# Patient Record
Sex: MALE | Race: WHITE | NOT HISPANIC OR LATINO
[De-identification: names, ages, dates, MRNs, and addresses within clinical notes are randomized per-mention and may not be internally consistent; named-entity substitution may affect disease eponyms.]

---

## 2022-05-17 PROBLEM — Z00.00 ENCOUNTER FOR PREVENTIVE HEALTH EXAMINATION: Status: ACTIVE | Noted: 2022-05-17

## 2022-05-18 ENCOUNTER — APPOINTMENT (OUTPATIENT)
Dept: ORTHOPEDIC SURGERY | Facility: CLINIC | Age: 67
End: 2022-05-18
Payer: COMMERCIAL

## 2022-05-18 VITALS — HEIGHT: 74 IN | BODY MASS INDEX: 25.67 KG/M2 | WEIGHT: 200 LBS

## 2022-05-18 DIAGNOSIS — Z86.79 PERSONAL HISTORY OF OTHER DISEASES OF THE CIRCULATORY SYSTEM: ICD-10-CM

## 2022-05-18 DIAGNOSIS — M79.604 PAIN IN RIGHT LEG: ICD-10-CM

## 2022-05-18 DIAGNOSIS — Z78.9 OTHER SPECIFIED HEALTH STATUS: ICD-10-CM

## 2022-05-18 DIAGNOSIS — Z86.39 PERSONAL HISTORY OF OTHER ENDOCRINE, NUTRITIONAL AND METABOLIC DISEASE: ICD-10-CM

## 2022-05-18 PROCEDURE — 99203 OFFICE O/P NEW LOW 30 MIN: CPT

## 2022-05-18 PROCEDURE — 72100 X-RAY EXAM L-S SPINE 2/3 VWS: CPT

## 2022-05-18 PROCEDURE — 73590 X-RAY EXAM OF LOWER LEG: CPT | Mod: RT

## 2022-05-18 RX ORDER — ATORVASTATIN CALCIUM 10 MG/1
10 TABLET, FILM COATED ORAL
Refills: 0 | Status: ACTIVE | COMMUNITY

## 2022-05-18 NOTE — PHYSICAL EXAM
[Normal Mood and Affect] : normal mood and affect [Able to Communicate] : able to communicate [Well Developed] : well developed [Well Nourished] : well nourished [Bilateral] : foot and ankle bilaterally [Right] : right tibia/fibula [4___] : right quadriceps 4[unfilled]/5 [FreeTextEntry3] : Inspection Lumbosacral spine normal [de-identified] : Straight leg raising is negative bilaterally [FreeTextEntry1] : Reviewed and interpreted.  Lumbosacral spine AP and lateral views-mild to moderate multilevel degenerative disc disease.  Mild aortic calcification [] : no calf tenderness [FreeTextEntry8] : No tenderness right leg [de-identified] : Reviewed and interpreted.  Right tib-fib AP and lateral views-negative

## 2022-05-18 NOTE — HISTORY OF PRESENT ILLNESS
[Right Leg] : right leg [Gradual] : gradual [8] : 8 [Radiating] : radiating [Sharp] : sharp [Intermittent] : intermittent [Household chores] : household chores [Rest] : rest [Walking] : walking [Exercising] : exercising [Retired] : Work status: retired [de-identified] : Patient is a 66-year-old male with onset of lower back pain after a long car ride in January 2022.  He had driven to [place].  Since that time he has mild to moderate pain in his lower leg when sitting and driving.  Pain is on the lateral aspect of his leg and gets worse as the day goes on.  He says he feels the pain in his thigh as well toward the end of the day.  Better when standing and walking.  No awakening from sleep at night.  Mild weakness.  No numbness.  No back pain.  No swelling in his leg.  Taking ibuprofen 800 mg p.r.n. [] : Post Surgical Visit: no [FreeTextEntry7] : R Leg

## 2022-05-18 NOTE — DISCUSSION/SUMMARY
[de-identified] : Continue ibuprofen 800 mg t.i.d. with food p.r.n.\par He will avoid irritating activities\par He'll have MRI lumbosacral spine\par If for any reason he develops any significant neurologic changes, weakness and lower extremities, loss of bowel bladder control, advised to go to emergency room immediately\par \par Impression:\par Lumbosacral spondylosis/radiculopathy

## 2022-05-24 ENCOUNTER — FORM ENCOUNTER (OUTPATIENT)
Age: 67
End: 2022-05-24

## 2022-05-24 ENCOUNTER — APPOINTMENT (OUTPATIENT)
Dept: MRI IMAGING | Facility: CLINIC | Age: 67
End: 2022-05-24

## 2022-05-25 ENCOUNTER — APPOINTMENT (OUTPATIENT)
Dept: MRI IMAGING | Facility: CLINIC | Age: 67
End: 2022-05-25
Payer: COMMERCIAL

## 2022-05-25 PROCEDURE — 72148 MRI LUMBAR SPINE W/O DYE: CPT

## 2022-05-27 ENCOUNTER — NON-APPOINTMENT (OUTPATIENT)
Age: 67
End: 2022-05-27

## 2022-06-01 ENCOUNTER — APPOINTMENT (OUTPATIENT)
Dept: ORTHOPEDIC SURGERY | Facility: CLINIC | Age: 67
End: 2022-06-01
Payer: COMMERCIAL

## 2022-06-01 VITALS — WEIGHT: 200 LBS | BODY MASS INDEX: 25.67 KG/M2 | HEIGHT: 74 IN

## 2022-06-01 PROCEDURE — 99214 OFFICE O/P EST MOD 30 MIN: CPT

## 2022-06-01 RX ORDER — METHYLPREDNISOLONE 4 MG/1
4 TABLET ORAL
Qty: 1 | Refills: 0 | Status: COMPLETED | COMMUNITY
Start: 2022-06-01

## 2022-06-01 NOTE — HISTORY OF PRESENT ILLNESS
[Lower back] : lower back [Right Leg] : right leg [Gradual] : gradual [8] : 8 [Radiating] : radiating [Sharp] : sharp [Household chores] : household chores [Leisure] : leisure [Rest] : rest [Sitting] : sitting [Retired] : Work status: retired [de-identified] : The patient has continued moderate sharp pain radiating down his right leg when sitting and driving.  Pain radiates in the gluteal region.  No numbness or weakness.  Occasional awakening from sleep at night.  Taking ibuprofen p.r.n.  He had MRI lumbosacral spine [] : Post Surgical Visit: no [FreeTextEntry7] : R Leg  [de-identified] : MRI

## 2022-06-01 NOTE — PHYSICAL EXAM
[Normal Mood and Affect] : normal mood and affect [Able to Communicate] : able to communicate [Well Developed] : well developed [Well Nourished] : well nourished [5___] : right quadriceps 5[unfilled]/5 [Bilateral] : foot and ankle bilaterally [2+] : posterior tibialis pulse: 2+ [FreeTextEntry3] : Inspection lumbosacral spinal [FreeTextEntry8] : Mild tenderness right lumbosacral region [de-identified] : Remainder motor lower extremities normal [de-identified] : Straight leg raising is positive for gluteal and leg pain at 50° on the right [] : no calf tenderness

## 2022-06-01 NOTE — DATA REVIEWED
[MRI] : MRI [Lumbar Spine] : lumbar spine [I independently reviewed and interpreted images and report] : I independently reviewed and interpreted images and report [FreeTextEntry1] : MRI lumbosacral spine done 5/25/22 was reviewed. There is mild multilevel degenerative disc disease. Mild broad disc protrusions L2-3, L3-4 and L4-5. No significant herniations or stenosis.\par Also reported as 2.2 cm cyst medial right kidney. Consider ultrasound to further evaluate as clinically indicated \par

## 2022-06-14 ENCOUNTER — APPOINTMENT (OUTPATIENT)
Dept: ORTHOPEDIC SURGERY | Facility: CLINIC | Age: 67
End: 2022-06-14
Payer: COMMERCIAL

## 2022-06-14 ENCOUNTER — NON-APPOINTMENT (OUTPATIENT)
Age: 67
End: 2022-06-14

## 2022-06-14 VITALS — HEIGHT: 74 IN | BODY MASS INDEX: 25.67 KG/M2 | WEIGHT: 200 LBS

## 2022-06-14 PROCEDURE — 99214 OFFICE O/P EST MOD 30 MIN: CPT

## 2022-06-14 RX ORDER — LOSARTAN POTASSIUM AND HYDROCHLOROTHIAZIDE 12.5; 1 MG/1; MG/1
100-12.5 TABLET ORAL
Qty: 90 | Refills: 0 | Status: ACTIVE | COMMUNITY
Start: 2021-12-09

## 2022-06-14 RX ORDER — METHYLPREDNISOLONE 4 MG/1
4 TABLET ORAL
Qty: 1 | Refills: 0 | Status: COMPLETED | COMMUNITY
Start: 2022-06-01 | End: 2022-06-14

## 2022-06-14 RX ORDER — VALACYCLOVIR 1 G/1
1 TABLET, FILM COATED ORAL
Qty: 30 | Refills: 0 | Status: ACTIVE | COMMUNITY
Start: 2022-02-10

## 2022-06-14 RX ORDER — FLUTICASONE PROPIONATE 50 UG/1
50 SPRAY, METERED NASAL
Qty: 48 | Refills: 0 | Status: ACTIVE | COMMUNITY
Start: 2022-01-25

## 2022-06-14 NOTE — HISTORY OF PRESENT ILLNESS
[Lower back] : lower back [Gradual] : gradual [7] : 7 [Radiating] : radiating [Household chores] : household chores [Leisure] : leisure [Rest] : rest [Walking] : walking [] : yes [Retired] : Work status: retired [de-identified] : The patient says he feels a little better after completing Medrol Dosepak.  He has been taking gabapentin 300 mg q.h.s. which has been helping him a little.  He could not tolerate 600 mg q.h.s.  This made him to tired.  He saw Dr. Sakina Simons.  He had MRIs of his thoracic spine and pelvis\par He has continued mild to moderate pain right gluteal region and leg when sitting and driving.  No numbness or weakness

## 2022-06-14 NOTE — DATA REVIEWED
[Thoracic Spine] : thoracic spine [I independently reviewed and interpreted images and report] : I independently reviewed and interpreted images and report [MRI] : MRI [Pelvis] : pelvis [FreeTextEntry2] : MRI of the pelvis done at Dr. Mclean June 14, 2022 without and with contrast ordered by Dr. Simons was reviewed.  There are moderate increase signal changes and the posterolateral gluteal musculature on the right.  Degenerative changes of the hips bilaterally with parallabral ganglion cysts [FreeTextEntry1] : MRI thoracic spine done at Dr. Mclean June 14, 2022 without and with contrast ordered by Dr. Simons was reviewed.  There are mild degenerative changes.  No significant stenosis or herniations.  Also reported as large hemangioma T6.  Small hemangiomas T10 and T11

## 2022-06-14 NOTE — DISCUSSION/SUMMARY
[Medication Risks Reviewed] : Medication risks reviewed [de-identified] : MRI results thoracic spine and pelvis were discussed with the patient\par Ibuprofen p.r.n.\par Continue gabapentin 300 mg q.h.s.\par Tylenol p.r.n.\par He will avoid irritating activities\par He is going to be driving to [place] in 8 days.  Recommend he stop frequently\par He will resume formal PT at [facility] in [place]\par \par \par Impression:\par Lumbosacral spondylosis\par Gluteal strain right hip

## 2022-06-14 NOTE — PHYSICAL EXAM
[Normal Mood and Affect] : normal mood and affect [Able to Communicate] : able to communicate [Well Developed] : well developed [Well Nourished] : well nourished [Bilateral] : foot and ankle bilaterally [2+] : posterior tibialis pulse: 2+ [FreeTextEntry3] : Inspection lumbosacral spinal [de-identified] : Right hip extensors 4+/5, remainder motor lower extremities normal [de-identified] : Straight leg raising is positive for gluteal and leg pain at 50° on the right [FreeTextEntry8] : Moderate tenderness right posterior gluteal region [] : no calf tenderness

## 2022-08-09 ENCOUNTER — APPOINTMENT (OUTPATIENT)
Dept: ORTHOPEDIC SURGERY | Facility: CLINIC | Age: 67
End: 2022-08-09

## 2022-08-09 VITALS — WEIGHT: 200 LBS | BODY MASS INDEX: 25.67 KG/M2 | HEIGHT: 74 IN

## 2022-08-09 DIAGNOSIS — M76.01 GLUTEAL TENDINITIS, RIGHT HIP: ICD-10-CM

## 2022-08-09 PROCEDURE — 99213 OFFICE O/P EST LOW 20 MIN: CPT

## 2022-08-09 RX ORDER — GABAPENTIN 300 MG/1
300 CAPSULE ORAL
Qty: 60 | Refills: 1 | Status: DISCONTINUED | COMMUNITY
Start: 2022-06-01 | End: 2022-08-09

## 2022-08-09 NOTE — IMAGING
[de-identified] : Normal gait\par \par Lumbosacral spine\par Inspection normal\par Slight tenderness right lumbosacral region\par Negative straight leg raising bilaterally\par \par Right hip\par Slight tenderness posterior gluteal region\par No pain with passive motion of the hip\par \par Both legs\par No swelling\par Calves are soft and nontender\par Posterior tibial pulse 2+ bilaterally

## 2022-08-09 NOTE — DISCUSSION/SUMMARY
[Medication Risks Reviewed] : Medication risks reviewed [de-identified] : Ibuprofen p.r.n.\par Tylenol p.r.n.\par He will avoid irritating activities\par Continuedl PT at [facility] in [place] over the next several weeks, then home program\par Continue home exercises\par \par \par Impression:\par Lumbosacral spondylosis\par Gluteal strain right hip

## 2022-08-09 NOTE — HISTORY OF PRESENT ILLNESS
[Gradual] : gradual [7] : 7 [Dull/Aching] : dull/aching [Intermittent] : intermittent [Physical therapy] : physical therapy [Exercising] : exercising [Retired] : Work status: retired [de-identified] : The patient is feeling much better.  He has been going to physical therapy twice a repeat.  Doing home exercises.  He has mild occasional pain right lumbosacral region.  No radicular complaints.  No numbness or weakness in his lower extremities.  Not taking any medications at the present time [] : Post Surgical Visit: no

## 2022-08-17 ENCOUNTER — FORM ENCOUNTER (OUTPATIENT)
Age: 67
End: 2022-08-17

## 2022-08-18 ENCOUNTER — APPOINTMENT (OUTPATIENT)
Dept: ORTHOPEDIC SURGERY | Facility: CLINIC | Age: 67
End: 2022-08-18

## 2022-08-18 ENCOUNTER — APPOINTMENT (OUTPATIENT)
Dept: MRI IMAGING | Facility: CLINIC | Age: 67
End: 2022-08-18

## 2022-08-18 VITALS — HEIGHT: 74 IN | BODY MASS INDEX: 25.67 KG/M2 | WEIGHT: 200 LBS

## 2022-08-18 PROCEDURE — 73721 MRI JNT OF LWR EXTRE W/O DYE: CPT | Mod: RT,MH

## 2022-08-18 PROCEDURE — 99214 OFFICE O/P EST MOD 30 MIN: CPT

## 2022-08-18 PROCEDURE — 73610 X-RAY EXAM OF ANKLE: CPT | Mod: RT

## 2022-08-18 RX ORDER — LOSARTAN POTASSIUM 100 MG/1
TABLET, FILM COATED ORAL
Refills: 0 | Status: COMPLETED | COMMUNITY
End: 2022-08-18

## 2022-08-18 NOTE — DISCUSSION/SUMMARY
[de-identified] : The patient was given a lace up ankle support to use when standing and walking p.r.n.\par Ice p.r.n.\par Ibuprofen p.r.n.\par He will have MRI right ankle\par \par Impression:\par Sinus tarsi syndrome right ankle

## 2022-08-18 NOTE — IMAGING
[Right] : right ankle [de-identified] : Slight right antalgic gait\par \par Right leg\par No swelling\par Calf is soft and nontender\par \par Right ankle\par No swelling\par Mild to moderate tenderness over the sinus Tarsi\par Mildly decreased passive range of motion of the ankle\par Ankle strength 5/5\par Negative De Oliveira test\par Posterior tibial pulse 2+  [FreeTextEntry9] : Reviewed and interpreted.  Right ankle AP, lateral and mortise-spurring anterior talar neck.  Mild degenerative changes of the subtalar joint

## 2022-08-18 NOTE — HISTORY OF PRESENT ILLNESS
[Gradual] : gradual [9] : 9 [3] : 3 [Dull/Aching] : dull/aching [Radiating] : radiating [Sharp] : sharp [Leisure] : leisure [Rest] : rest [Standing] : standing [Walking] : walking [Retired] : Work status: retired [de-identified] : The patient has had pain right lateral ankle region over the past several months.  No injury.  He has mild to moderate pain when standing and walking.  Pain after sitting and getting up.  No swelling.  Taking ibuprofen p.r.n. [] : Post Surgical Visit: no [FreeTextEntry7] : Right lateral leg

## 2022-08-19 ENCOUNTER — TRANSCRIPTION ENCOUNTER (OUTPATIENT)
Age: 67
End: 2022-08-19

## 2022-08-21 ENCOUNTER — NON-APPOINTMENT (OUTPATIENT)
Age: 67
End: 2022-08-21

## 2022-08-25 ENCOUNTER — APPOINTMENT (OUTPATIENT)
Dept: ORTHOPEDIC SURGERY | Facility: CLINIC | Age: 67
End: 2022-08-25

## 2022-08-25 VITALS — WEIGHT: 200 LBS | HEIGHT: 74 IN | BODY MASS INDEX: 25.67 KG/M2

## 2022-08-25 DIAGNOSIS — M76.71 PERONEAL TENDINITIS, RIGHT LEG: ICD-10-CM

## 2022-08-25 DIAGNOSIS — R93.6 ABNORMAL FINDINGS ON DIAGNOSTIC IMAGING OF LIMBS: ICD-10-CM

## 2022-08-25 PROCEDURE — 99214 OFFICE O/P EST MOD 30 MIN: CPT

## 2022-08-25 NOTE — IMAGING
[de-identified] : Normal gait\par \par Right leg\par No swelling\par Calf is soft and nontender\par \par Right ankle\par No swelling\par Mild tenderness over the sinus Tarsi.  Mild tenderness over the distal peroneal tendons\par Mildly decreased passive range of motion of the ankle\par Ankle strength 5/5\par Negative De Oliveira test\par Posterior tibial pulse 2+

## 2022-08-25 NOTE — HISTORY OF PRESENT ILLNESS
[Rest] : rest [Walking] : walking [Retired] : Work status: retired [de-identified] : The patient is feeling a lot better.  He has been wearing lace up right ankle support during daytime.  He has mild occasional pain right lateral ankle when standing and walking.  He had MRI right ankle [] : no [FreeTextEntry1] : right ankle [de-identified] : 08/18/2022 [de-identified] : Dr. Man [de-identified] : MRI

## 2022-08-25 NOTE — DATA REVIEWED
[MRI] : MRI [Right] : of the right [Ankle] : ankle [I independently reviewed and interpreted images and report] : I independently reviewed and interpreted images and report [FreeTextEntry1] : MRI right ankle done August 18, 2022 was reviewed. There are moderate increased signal changes in the peroneus brevis tendon posterior and inferior to lateral malleolus with mild surrounding tenosynovitis. Mild increased signal changes peroneal longus tendon.\par Also reported as mild Achilles, posterior tibial, anterior tibial tendinopathy. There are degenerative changes of the subtalar joint with mild sinus tarsi synovitis. Degenerative changes of the talonavicular joint.\par Also reported as tiny osteochondral lesion peripheral lateral talar dome \par

## 2022-08-25 NOTE — DISCUSSION/SUMMARY
[de-identified] : MRI results right ankle were discussed with the patient\par Continue lace up support right ankle when standing and walking  on a regular basis over the next month, and then he will try to wean\par He did not want to do any physical therapy\par Ice p.r.n.\par Ibuprofen p.r.n.\par \par Impression:\par Sinus tarsi syndrome right ankle\par Peroneal tendinitis right ankle

## 2022-09-20 ENCOUNTER — APPOINTMENT (OUTPATIENT)
Dept: ORTHOPEDIC SURGERY | Facility: CLINIC | Age: 67
End: 2022-09-20

## 2022-09-20 VITALS — WEIGHT: 200 LBS | HEIGHT: 74 IN | BODY MASS INDEX: 25.67 KG/M2

## 2022-09-20 DIAGNOSIS — G57.91 UNSPECIFIED MONONEUROPATHY OF RIGHT LOWER LIMB: ICD-10-CM

## 2022-09-20 PROCEDURE — 99214 OFFICE O/P EST MOD 30 MIN: CPT | Mod: 25

## 2022-09-20 PROCEDURE — 73600 X-RAY EXAM OF ANKLE: CPT | Mod: RT

## 2022-09-20 PROCEDURE — 73630 X-RAY EXAM OF FOOT: CPT | Mod: RT

## 2022-09-20 PROCEDURE — 20605 DRAIN/INJ JOINT/BURSA W/O US: CPT

## 2022-09-20 NOTE — PHYSICAL EXAM
[Right] : right foot and ankle [NL (40)] : plantar flexion 40 degrees [NL 30)] : inversion 30 degrees [NL (20)] : eversion 20 degrees [5___] : eversion 5[unfilled]/5 [2+] : dorsalis pedis pulse: 2+ [] : negative anterior drawer at ankle [TWNoteComboBox7] : dorsiflexion 15 degrees

## 2022-09-20 NOTE — DATA REVIEWED
[MRI] : MRI [Right] : of the right [Ankle] : ankle [I reviewed the films/CD and additionally noted] : I reviewed the films/CD and additionally noted [FreeTextEntry1] : peroneus brevis split tear - no sig pathology in area of tenderness

## 2022-09-20 NOTE — ASSESSMENT
[FreeTextEntry1] : wbat\par csi today in sinus tarsi\par no pain in area of peroneal pathology\par pain may be neurologic - rec emg to eval as well\par further plan based on response to injection and emg findings\par \par Medium Joint Injection was performed because of pain and inflammation Anesthesia: ethyl chloride sprayed topically. An injection of Celestone 1cc and Lidocaine without epinephrine 1cc was injected in the right sinus tarsi.  Patient has tried OTC's including aspirin, Ibuprofen, Aleve etc or prescription NSAIDS, and/or exercises at home and/ or physical therapy without satisfactory response After verbal consent using sterile preparation and technique.The risks, benefits, and alternatives to cortisone injection were explained in full to the patient. Risks outlined include but are not limited to infection, sepsis, bleeding, scarring, skin discoloration, temporary increase in pain, syncopal episode, failure to resolve symptoms, allergic reaction, symptom recurrence, and elevation of blood sugar in diabetics. Patient understood the risks. All questions were answered. After discussion of options, patient requested an injection. Oral informed consent was obtained and sterile prep was done of the injection site. Sterile technique was utilized for the procedure including the preparation of the solutions used for the injection. Patient tolerated the procedure well. Advised to ice the injection site this evening. Prep with betadine locally to site. Sterile technique used.\par

## 2022-09-20 NOTE — HISTORY OF PRESENT ILLNESS
[8] : 8 [3] : 3 [Dull/Aching] : dull/aching [Sharp] : sharp [de-identified] : 09/20/2022:  several months of right ankle pain without hx of trauma. Treated initially by Dr. Man and Xrays/MRI obtained.  was going to PT w/o relief. prior fx tx w/ cast 40-50 yrs ago. no dm/tob. retired [] : no [FreeTextEntry1] : right ankle [de-identified] : brace [de-identified] : Donovan [de-identified] : xray, MRI

## 2022-10-03 ENCOUNTER — APPOINTMENT (OUTPATIENT)
Dept: NEUROLOGY | Facility: CLINIC | Age: 67
End: 2022-10-03

## 2022-10-04 ENCOUNTER — APPOINTMENT (OUTPATIENT)
Dept: ORTHOPEDIC SURGERY | Facility: CLINIC | Age: 67
End: 2022-10-04

## 2022-10-04 VITALS — WEIGHT: 200 LBS | HEIGHT: 74 IN | BODY MASS INDEX: 25.67 KG/M2

## 2022-10-04 DIAGNOSIS — M25.571 PAIN IN RIGHT ANKLE AND JOINTS OF RIGHT FOOT: ICD-10-CM

## 2022-10-04 PROCEDURE — 99214 OFFICE O/P EST MOD 30 MIN: CPT

## 2022-10-04 NOTE — PHYSICAL EXAM
[Right] : right foot and ankle [NL (40)] : plantar flexion 40 degrees [NL 30)] : inversion 30 degrees [NL (20)] : eversion 20 degrees [5___] : eversion 5[unfilled]/5 [2+] : dorsalis pedis pulse: 2+ [] : non-antalgic [FreeTextEntry8] : improved [TWNoteComboBox7] : dorsiflexion 15 degrees

## 2022-10-04 NOTE — HISTORY OF PRESENT ILLNESS
[0] : 0 [Retired] : Work status: retired [de-identified] : 09/20/2022:  several months of right ankle pain without hx of trauma. Treated initially by Dr. Man and Xrays/MRI obtained.  was going to PT w/o relief. prior fx tx w/ cast 40-50 yrs ago. no dm/tob. retired\par \par 10/04/2022: pain resolved w/ injections. did not have emg done.  [] : Post Surgical Visit: no [FreeTextEntry1] : right ankle [de-identified] : brace [de-identified] : Janes  [de-identified] : xray, MRI

## 2022-10-04 NOTE — ASSESSMENT
[FreeTextEntry1] : wbat\par no pain in area of peroneal pathology\par c/o generalized RLE pain - considering emg. will give rx for PT as well\par can consider repeat injection in future if needed\par f/up prn\par

## 2022-10-06 ENCOUNTER — APPOINTMENT (OUTPATIENT)
Dept: ORTHOPEDIC SURGERY | Facility: CLINIC | Age: 67
End: 2022-10-06

## 2022-12-21 ENCOUNTER — TRANSCRIPTION ENCOUNTER (OUTPATIENT)
Age: 67
End: 2022-12-21

## 2022-12-27 ENCOUNTER — APPOINTMENT (OUTPATIENT)
Dept: ORTHOPEDIC SURGERY | Facility: CLINIC | Age: 67
End: 2022-12-27

## 2022-12-27 VITALS — HEIGHT: 74 IN | BODY MASS INDEX: 25.67 KG/M2 | WEIGHT: 200 LBS

## 2022-12-27 DIAGNOSIS — G57.81 OTHER SPECIFIED MONONEUROPATHIES OF RIGHT LOWER LIMB: ICD-10-CM

## 2022-12-27 DIAGNOSIS — M54.17 RADICULOPATHY, LUMBOSACRAL REGION: ICD-10-CM

## 2022-12-27 PROCEDURE — 99214 OFFICE O/P EST MOD 30 MIN: CPT

## 2022-12-27 NOTE — PHYSICAL EXAM
[Right] : right foot and ankle [NL (40)] : plantar flexion 40 degrees [NL 30)] : inversion 30 degrees [2+] : dorsalis pedis pulse: 2+ [1st] : 1st [NL (20)] : dorsiflexion 20 degrees [5___] : [facility] 5[unfilled]/5 [] : non-antalgic [TWNoteComboBox7] : dorsiflexion 15 degrees

## 2022-12-27 NOTE — HISTORY OF PRESENT ILLNESS
[0] : 0 [Retired] : Work status: retired [de-identified] : 09/20/2022:  several months of right ankle pain without hx of trauma. Treated initially by Dr. Man and Xrays/MRI obtained.  was going to PT w/o relief. prior fx tx w/ cast 40-50 yrs ago. no dm/tob. retired\par \par 10/04/2022: pain resolved w/ injections. did not have emg done. \par \par 12/27/2022: no pain currently. having numbness in toes w/ activity. had emg 6/2022 w/ L5-S1 radic.  [] : Post Surgical Visit: no [FreeTextEntry1] : right ankle [de-identified] : brace [de-identified] : Janes  [de-identified] : xray, MRI

## 2022-12-27 NOTE — ASSESSMENT
[FreeTextEntry1] : wbat\par supportive shoe\par ice/elevate\par nsaids prn\par consider csi in future if needed\par f/up prn
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
## 2023-01-23 ENCOUNTER — TRANSCRIPTION ENCOUNTER (OUTPATIENT)
Age: 68
End: 2023-01-23

## 2023-01-26 ENCOUNTER — APPOINTMENT (OUTPATIENT)
Dept: ORTHOPEDIC SURGERY | Facility: CLINIC | Age: 68
End: 2023-01-26
Payer: MEDICARE

## 2023-01-26 VITALS — BODY MASS INDEX: 25.67 KG/M2 | HEIGHT: 74 IN | WEIGHT: 200 LBS

## 2023-01-26 PROCEDURE — 73130 X-RAY EXAM OF HAND: CPT | Mod: RT

## 2023-01-26 PROCEDURE — 99213 OFFICE O/P EST LOW 20 MIN: CPT | Mod: 25

## 2023-01-26 PROCEDURE — 20550 NJX 1 TENDON SHEATH/LIGAMENT: CPT | Mod: RT

## 2023-01-26 RX ORDER — OXYBUTYNIN CHLORIDE 10 MG/1
10 TABLET, EXTENDED RELEASE ORAL
Qty: 90 | Refills: 0 | Status: COMPLETED | COMMUNITY
Start: 2021-09-29 | End: 2023-01-26

## 2023-01-26 RX ORDER — BETAMETHASONE ACETATE AND BETAMETHASONE SODIUM PHOSPHATE 3; 3 MG/ML; MG/ML
6 (3-3) INJECTION, SUSPENSION INTRA-ARTICULAR; INTRALESIONAL; INTRAMUSCULAR; SOFT TISSUE
Refills: 0 | Status: COMPLETED | OUTPATIENT
Start: 2023-01-26

## 2023-01-26 RX ADMIN — BETAMETHASONE ACETATE AND BETAMETHASONE SODIUM PHOSPHATE MG/ML: 3; 3 INJECTION, SUSPENSION INTRA-ARTICULAR; INTRALESIONAL; INTRAMUSCULAR; SOFT TISSUE at 00:00

## 2023-01-26 NOTE — DISCUSSION/SUMMARY
[de-identified] : I offered the patient a cortisone injection right hand today.  Risks benefits and the alternatives were discussed.  He will like to do this\par I discussed possible repeat cortisone injection and trigger finger release if ongoing symptoms\par Ice p.r.n.\par Tylenol p.r.n.\par \par Impression:\par Right third trigger finger

## 2023-01-26 NOTE — IMAGING
[de-identified] : Right hand and wrist\par No swelling\par The patient is unable to flex his right third finger completely.  The fingertip is 2 cm from distal palmar crease\par Moderate tenderness volar aspect third MP joint\par Sensation intact\par Intrinsic strength intact\par Negative Tinel's over the median nerve\par Radial pulse 2+ [Right] : right hand [FreeTextEntry1] : Reviewed and interpreted.  Right hand and wrist AP, lateral and oblique views-mild degenerative changes of the IP joints

## 2023-01-26 NOTE — HISTORY OF PRESENT ILLNESS
[Gradual] : gradual [5] : 5 [0] : 0 [Sharp] : sharp [Intermittent] : intermittent [Leisure] : leisure [Rest] : rest [Exercising] : exercising [Retired] : Work status: retired [de-identified] : Patient is a 67-year-old right-hand-dominant male with catching and locking right third finger over the past several weeks.  No injury.  He has mild to moderate pain when trying to lift objects.  Difficulty making a clenched fist.  No numbness or tingling.  No swelling [] : Post Surgical Visit: no

## 2023-01-26 NOTE — PROCEDURE
[Tendon Sheath] : tendon sheath [Right] : of the right [3rd] : 3rd trigger finger [Pain] : pain [Inflammation] : inflammation [Alcohol] : alcohol [Betadine] : betadine [Ethyl Chloride sprayed topically] : ethyl chloride sprayed topically [Sterile technique used] : sterile technique used [___ cc    6mg] :  Betamethasone (Celestone) ~Vcc of 6mg [___ cc    1%] : Lidocaine ~Vcc of 1%  [] : Patient tolerated procedure well [Patient was advised to rest the joint(s) for ____ days] : patient was advised to rest the joint(s) for [unfilled] days [Risks, benefits, alternatives discussed / Verbal consent obtained] : the risks benefits, and alternatives have been discussed, and verbal consent was obtained [FreeTextEntry3] : Do not submerge underwater for 24 hours

## 2023-01-28 ENCOUNTER — OFFICE (OUTPATIENT)
Dept: URBAN - METROPOLITAN AREA CLINIC 109 | Facility: CLINIC | Age: 68
Setting detail: OPHTHALMOLOGY
End: 2023-01-28
Payer: MEDICARE

## 2023-01-28 VITALS — HEIGHT: 60 IN

## 2023-01-28 DIAGNOSIS — H25.13: ICD-10-CM

## 2023-01-28 DIAGNOSIS — H43.812: ICD-10-CM

## 2023-01-28 PROCEDURE — 99213 OFFICE O/P EST LOW 20 MIN: CPT | Performed by: OPHTHALMOLOGY

## 2023-01-28 ASSESSMENT — KERATOMETRY
OD_K1POWER_DIOPTERS: 41.75
OS_K2POWER_DIOPTERS: 42.62
OD_K2POWER_DIOPTERS: 42.25
OS_AXISANGLE_DEGREES: 76
OD_AXISANGLE_DEGREES: 94
OS_K1POWER_DIOPTERS: 41.62

## 2023-01-28 ASSESSMENT — REFRACTION_CURRENTRX
OS_CYLINDER: -0.75
OD_CYLINDER: -0.50
OD_AXIS: 176
OS_AXIS: 158
OS_SPHERE: -1.00
OD_OVR_VA: 20/
OS_OVR_VA: 20/
OD_SPHERE: -1.50

## 2023-01-28 ASSESSMENT — REFRACTION_AUTOREFRACTION
OD_SPHERE: -0.75
OS_AXIS: 139
OD_CYLINDER: -0.50
OS_CYLINDER: -0.25
OD_AXIS: 170
OS_SPHERE: -0.50

## 2023-01-28 ASSESSMENT — TONOMETRY
OS_IOP_MMHG: 17
OD_IOP_MMHG: 17

## 2023-01-28 ASSESSMENT — VISUAL ACUITY
OD_BCVA: 20/20
OS_BCVA: 20/25-1

## 2023-01-28 ASSESSMENT — REFRACTION_MANIFEST
OS_VA1: 20/20-
OD_SPHERE: -1.25
OD_VA1: 20/20-
OS_SPHERE: -0.50
OD_ADD: +2.50
OS_ADD: +2.50

## 2023-01-28 ASSESSMENT — CONFRONTATIONAL VISUAL FIELD TEST (CVF)
OS_FINDINGS: FULL
OD_FINDINGS: FULL

## 2023-01-28 ASSESSMENT — SPHEQUIV_DERIVED
OD_SPHEQUIV: -1
OS_SPHEQUIV: -0.625

## 2023-01-28 ASSESSMENT — LID EXAM ASSESSMENTS
OS_BLEPHARITIS: 1+
OD_BLEPHARITIS: 1+

## 2023-01-28 ASSESSMENT — AXIALLENGTH_DERIVED
OS_AL: 24.37
OD_AL: 24.5715

## 2023-02-08 ENCOUNTER — APPOINTMENT (OUTPATIENT)
Dept: ORTHOPEDIC SURGERY | Facility: CLINIC | Age: 68
End: 2023-02-08
Payer: MEDICARE

## 2023-02-08 VITALS — WEIGHT: 200 LBS | HEIGHT: 74 IN | BODY MASS INDEX: 25.67 KG/M2

## 2023-02-08 PROCEDURE — 72100 X-RAY EXAM L-S SPINE 2/3 VWS: CPT

## 2023-02-08 PROCEDURE — 99214 OFFICE O/P EST MOD 30 MIN: CPT

## 2023-02-08 PROCEDURE — 72170 X-RAY EXAM OF PELVIS: CPT

## 2023-02-08 RX ORDER — CYCLOBENZAPRINE HYDROCHLORIDE 5 MG/1
5 TABLET, FILM COATED ORAL
Qty: 60 | Refills: 1 | Status: ACTIVE | COMMUNITY
Start: 2023-02-08 | End: 1900-01-01

## 2023-02-08 NOTE — REASON FOR VISIT
[FreeTextEntry2] : Decreased pain right hand\par Recurrence of lower back pain over the past 6-7 days

## 2023-02-08 NOTE — DISCUSSION/SUMMARY
[Medication Risks Reviewed] : Medication risks reviewed [de-identified] : The patient is referred to [facility] in [place] for therapy program 2-3 times a week\par Moist heat to his lower back p.r.n.\par Ice to his right hand p.r.n.\par He is going to be going to [place]\par Continue ibuprofen p.r.n.\par Tylenol p.r.n.\par He can try cyclobenzaprine 5-10 mg q.8 h. p.r.n. pain/spasm.  Not to take this if he is driving or needs to be alert\par \par Impression:\par Lumbosacral strain/spondylosis\par Right third trigger finger/Tenosynovitis

## 2023-02-08 NOTE — IMAGING
[de-identified] : Slight left antalgic gait\par \par Lumbosacral spine\par Inspection normal\par Mild to moderate tenderness paraspinals.  Lumbosacral region with mild spasm\par Straight leg raising is positive For lower back pain at 45° on the left\par Motor exam lower extremities-normal\par \par Hips\par Full painless passive range of motion\par No tenderness\par \par Both legs\par No swelling\par Calves are soft and nontender\par Posterior tibial pulse 2+ bilaterally\par \par Right hand and wrist\par No swelling\par Full active motion of the wrist and fingers\par Slight tenderness volar aspect third MP joint\par Sensation intact\par Intrinsic strength intact\par Negative Tinel's over the median nerve\par Radial pulse 2+ [FreeTextEntry1] : Reviewed and interpreted.  Lumbosacral spine AP and lateral views-mild degenerative changes.  Mild vascular calcification [de-identified] : Reviewed and interpreted.  Pelvis AP-mild to moderate degenerative changes the hips bilaterally

## 2023-02-08 NOTE — HISTORY OF PRESENT ILLNESS
[Gradual] : gradual [1] : 2 [Intermittent] : intermittent [Rest] : rest [Retired] : Work status: retired [de-identified] : The patient says his right hand is feeling better since cortisone injection last visit.  He has mild stiffness and discomfort.  No catching or locking.  No numbness or tingling\par The patient has had recurrence of lower back pain over the past 6-7 days.  No injury.  He has mild to moderate pain left lumbosacral region with change in position.  Occasional pain radiating down his right leg.  Occasional spasm.  No numbness or weakness.  No urinary complaints.  Taking ibuprofen p.r.n. [] : Post Surgical Visit: no

## 2023-03-09 ENCOUNTER — APPOINTMENT (OUTPATIENT)
Dept: ORTHOPEDIC SURGERY | Facility: CLINIC | Age: 68
End: 2023-03-09
Payer: MEDICARE

## 2023-03-09 VITALS — HEIGHT: 74 IN | BODY MASS INDEX: 25.67 KG/M2 | WEIGHT: 200 LBS

## 2023-03-09 DIAGNOSIS — M65.9 SYNOVITIS AND TENOSYNOVITIS, UNSPECIFIED: ICD-10-CM

## 2023-03-09 PROCEDURE — 99214 OFFICE O/P EST MOD 30 MIN: CPT

## 2023-03-09 NOTE — PHYSICAL EXAM
[Normal Mood and Affect] : normal mood and affect [Able to Communicate] : able to communicate [Well Developed] : well developed [Well Nourished] : well nourished [de-identified] : The patient is in mild distress

## 2023-03-09 NOTE — DISCUSSION/SUMMARY
[Medication Risks Reviewed] : Medication risks reviewed [de-identified] : Continue PT for his lower back and right hand had [facility] in [place]\par Moist heat to his lower back p.r.n.\par Ice to his right hand p.r.n.\par Continue ibuprofen p.r.n.\par Tylenol p.r.n.\par Cyclobenzaprine 5-10 mg p.r.n. pain/spasm.  Not to take this if he is driving or needs to be alert\par I discussed possible repeat cortisone injection right finger if symptoms worsen\par \par Impression:\par Lumbosacral strain/spondylosis\par Right third trigger finger/Tenosynovitis

## 2023-03-09 NOTE — IMAGING
[de-identified] : Slight left antalgic gait\par \par Lumbosacral spine\par Inspection normal\par Mild tenderness left lumbosacral region\par Negative straight leg raising bilaterally\par Motor exam lower extremities-normal\par \par Hips\par Full painless passive range of motion\par No tenderness\par \par Both legs\par No swelling\par Calves are soft and nontender\par Posterior tibial pulse 2+ bilaterally\par \par Right hand and wrist\par No swelling\par Full active motion of the wrist and fingers.  No visible catching with active motion\par Mild tenderness volar aspect third MP joint\par Sensation intact\par Intrinsic strength intact\par Negative Tinel's over the median nerve\par Radial pulse 2+ [FreeTextEntry1] : Reviewed and interpreted.  Lumbosacral spine AP and lateral views-mild degenerative changes.  Mild vascular calcification [de-identified] : Reviewed and interpreted.  Pelvis AP-mild to moderate degenerative changes the hips bilaterally

## 2023-03-09 NOTE — REASON FOR VISIT
[FreeTextEntry2] : Decreased lower back pain\par Occasional catching right third finger over the past few days

## 2023-03-09 NOTE — HISTORY OF PRESENT ILLNESS
[Gradual] : gradual [2] : 2 [Dull/Aching] : dull/aching [Intermittent] : intermittent [Retired] : Work status: retired [de-identified] : The patient has occasional catching right third finger over the past few days.  Mild pain with activities.\par His lower back is feeling better since last visit.  He has been doing physical therapy.  He has mild occasional pain left lumbosacral region.  No radicular complaints.  No numbness or weakness in his lower extremities.  Doing home exercises [] : Post Surgical Visit: no [de-identified] : Bending back  [de-identified] : 2/8/2023 [de-identified] : Dr. Man

## 2023-04-12 ENCOUNTER — APPOINTMENT (OUTPATIENT)
Dept: ORTHOPEDIC SURGERY | Facility: CLINIC | Age: 68
End: 2023-04-12

## 2023-10-03 NOTE — PHYSICAL EXAM
[Normal Mood and Affect] : normal mood and affect [Able to Communicate] : able to communicate no distress/obese [Well Developed] : well developed [Well Nourished] : well nourished [de-identified] : The patient is in mild distress

## 2023-12-26 ENCOUNTER — APPOINTMENT (OUTPATIENT)
Dept: ORTHOPEDIC SURGERY | Facility: CLINIC | Age: 68
End: 2023-12-26
Payer: MEDICARE

## 2023-12-26 VITALS — HEIGHT: 74 IN | BODY MASS INDEX: 25.67 KG/M2 | WEIGHT: 200 LBS

## 2023-12-26 DIAGNOSIS — M65.331 TRIGGER FINGER, RIGHT MIDDLE FINGER: ICD-10-CM

## 2023-12-26 DIAGNOSIS — S39.012D STRAIN OF MUSCLE, FASCIA AND TENDON OF LOWER BACK, SUBSEQUENT ENCOUNTER: ICD-10-CM

## 2023-12-26 DIAGNOSIS — M47.817 SPONDYLOSIS W/OUT MYELOPATHY OR RADICULOPATHY, LUMBOSACRAL REGION: ICD-10-CM

## 2023-12-26 PROCEDURE — 99214 OFFICE O/P EST MOD 30 MIN: CPT

## 2023-12-26 NOTE — PHYSICAL EXAM
[Normal Mood and Affect] : normal mood and affect [Able to Communicate] : able to communicate [Well Developed] : well developed [Well Nourished] : well nourished [de-identified] : The patient is in mild distress

## 2023-12-26 NOTE — REASON FOR VISIT
[FreeTextEntry2] : Continued catching and locking right third finger Continued lower back pain radiating down right leg

## 2023-12-26 NOTE — IMAGING
[de-identified] : Normal gait  Lumbosacral spine Inspection normal Mild tenderness right lumbosacral region Negative straight leg raising bilaterally Motor exam lower extremities-normal  Hips Full painless passive range of motion No tenderness  Both legs No swelling Calves are soft and nontender Posterior tibial pulse 2+ bilaterally  Right hand and wrist No swelling Full active motion of the wrist and fingers.  Occasional catching right third finger with active motion Mild to moderate tenderness volar aspect third MP joint Sensation intact Intrinsic strength intact Negative Tinel's over the median nerve Radial pulse 2+

## 2023-12-26 NOTE — DISCUSSION/SUMMARY
[Medication Risks Reviewed] : Medication risks reviewed [de-identified] : The patient will have new MRI lumbosacral spine Moist heat to his lower back p.r.n. Ice to his right hand p.r.n. Continue ibuprofen 600 mg 3 times daily with food p.r.n. Tylenol p.r.n. The patient is going to be going back to [place], [place] for the winter.  He is leaving this weekend. He is referred for physical therapy for his lower back Recommend he have a hand consult in [place] for possible trigger finger release Orthopedic follow-up in [place] for his lower back if ongoing problems over the next several weeks  Impression: Lumbosacral strain/spondylosis Right third trigger finger/Tenosynovitis

## 2023-12-26 NOTE — HISTORY OF PRESENT ILLNESS
[Lower back] : lower back [Right Leg] : right leg [Gradual] : gradual [Dull/Aching] : dull/aching [Radiating] : radiating [Sharp] : sharp [Intermittent] : intermittent [Leisure] : leisure [Rest] : rest [Sitting] : sitting [Retired] : Work status: retired [de-identified] : The patient has had continued catching and locking right third finger.  He says he did not have much improvement after previous cortisone injection He has continued mild to moderate pain in his lower back when bending and sitting.  Occasional pain radiating down his right leg.  No numbness or weakness. [] : Post Surgical Visit: no [FreeTextEntry7] : R Leg  [FreeTextEntry1] : R 3rd Finger  [de-identified] : 3/9/2023 [de-identified] : Dr. Man

## 2023-12-29 ENCOUNTER — APPOINTMENT (OUTPATIENT)
Dept: MRI IMAGING | Facility: CLINIC | Age: 68
End: 2023-12-29

## 2024-01-29 ENCOUNTER — OFFICE (OUTPATIENT)
Dept: URBAN - METROPOLITAN AREA CLINIC 109 | Facility: CLINIC | Age: 69
Setting detail: OPHTHALMOLOGY
End: 2024-01-29
Payer: MEDICARE

## 2024-01-29 VITALS — HEIGHT: 60 IN

## 2024-01-29 DIAGNOSIS — H25.13: ICD-10-CM

## 2024-01-29 DIAGNOSIS — H43.812: ICD-10-CM

## 2024-01-29 PROCEDURE — 92014 COMPRE OPH EXAM EST PT 1/>: CPT | Performed by: OPHTHALMOLOGY

## 2024-01-29 PROCEDURE — 92201 OPSCPY EXTND RTA DRAW UNI/BI: CPT | Performed by: OPHTHALMOLOGY

## 2024-01-29 ASSESSMENT — REFRACTION_CURRENTRX
OS_SPHERE: -1.00
OD_AXIS: 176
OS_OVR_VA: 20/
OS_CYLINDER: -0.75
OD_CYLINDER: -0.50
OD_OVR_VA: 20/
OD_SPHERE: -1.50
OS_AXIS: 158

## 2024-01-29 ASSESSMENT — REFRACTION_AUTOREFRACTION
OS_CYLINDER: -0.25
OD_AXIS: 137
OD_SPHERE: -0.50
OS_SPHERE: 0.00
OD_CYLINDER: -0.25
OS_AXIS: 149

## 2024-01-29 ASSESSMENT — REFRACTION_MANIFEST
OS_VA1: 20/20-
OS_SPHERE: -0.50
OD_ADD: +2.50
OS_ADD: +2.50
OD_SPHERE: -1.25
OD_VA1: 20/20-

## 2024-01-29 ASSESSMENT — LID EXAM ASSESSMENTS
OD_BLEPHARITIS: 1+
OS_BLEPHARITIS: 1+

## 2024-01-29 ASSESSMENT — CONFRONTATIONAL VISUAL FIELD TEST (CVF)
OD_FINDINGS: FULL
OS_FINDINGS: FULL

## 2024-01-29 ASSESSMENT — SPHEQUIV_DERIVED
OD_SPHEQUIV: -0.625
OS_SPHEQUIV: -0.125

## 2024-06-18 NOTE — DISCUSSION/SUMMARY
Detail Level: Zone [Medication Risks Reviewed] : Medication risks reviewed [de-identified] : MRI lumbosacral spine was discussed with the patient\par Discontinue ibuprofen.  He can try Medrol Dosepak.  He was instructed in how to take this.  He may resume ibuprofen a day after he finishes the Medrol Dosepak.  Not to take any NSAIDs when taking the Medrol Dosepak\par He can try gabapentin 300 mg to 600 mg q.h.s.\par Tylenol p.r.n.\par He will avoid irritating activities\par Recommend neurology consult with Dr. Sakina Simons\par He is going to be driving to [place] in 3 weeks\par He says he tried physical therapy but this made him worse\par The patient is aware of renal cyst and is followed by urologist Dr. Barnes\par \par Impression:\par Lumbosacral spondylosis/radiculopathy